# Patient Record
Sex: FEMALE | Race: WHITE | NOT HISPANIC OR LATINO | Employment: FULL TIME | URBAN - METROPOLITAN AREA
[De-identification: names, ages, dates, MRNs, and addresses within clinical notes are randomized per-mention and may not be internally consistent; named-entity substitution may affect disease eponyms.]

---

## 2022-10-26 ENCOUNTER — LAB (OUTPATIENT)
Dept: LAB | Facility: HOSPITAL | Age: 22
End: 2022-10-26

## 2022-10-26 ENCOUNTER — OFFICE VISIT (OUTPATIENT)
Dept: INTERNAL MEDICINE | Facility: CLINIC | Age: 22
End: 2022-10-26

## 2022-10-26 VITALS
HEART RATE: 76 BPM | OXYGEN SATURATION: 98 % | SYSTOLIC BLOOD PRESSURE: 98 MMHG | TEMPERATURE: 97 F | BODY MASS INDEX: 21.33 KG/M2 | WEIGHT: 144 LBS | DIASTOLIC BLOOD PRESSURE: 70 MMHG | HEIGHT: 69 IN

## 2022-10-26 DIAGNOSIS — R55 SYNCOPE, UNSPECIFIED SYNCOPE TYPE: ICD-10-CM

## 2022-10-26 DIAGNOSIS — Z13.1 SCREENING FOR DIABETES MELLITUS (DM): ICD-10-CM

## 2022-10-26 DIAGNOSIS — Z13.21 ENCOUNTER FOR VITAMIN DEFICIENCY SCREENING: ICD-10-CM

## 2022-10-26 DIAGNOSIS — R42 DIZZINESS: ICD-10-CM

## 2022-10-26 DIAGNOSIS — Z76.89 ENCOUNTER TO ESTABLISH CARE: Primary | ICD-10-CM

## 2022-10-26 DIAGNOSIS — R23.1 PALLOR: ICD-10-CM

## 2022-10-26 DIAGNOSIS — R09.82 PND (POST-NASAL DRIP): ICD-10-CM

## 2022-10-26 DIAGNOSIS — Z13.220 SCREENING FOR HYPERLIPIDEMIA: ICD-10-CM

## 2022-10-26 LAB
25(OH)D3 SERPL-MCNC: 33 NG/ML (ref 30–100)
ALBUMIN SERPL-MCNC: 4.8 G/DL (ref 3.5–5.2)
ALBUMIN/GLOB SERPL: 1.5 G/DL
ALP SERPL-CCNC: 70 U/L (ref 39–117)
ALT SERPL W P-5'-P-CCNC: 27 U/L (ref 1–33)
ANION GAP SERPL CALCULATED.3IONS-SCNC: 10.3 MMOL/L (ref 5–15)
AST SERPL-CCNC: 25 U/L (ref 1–32)
BACTERIA UR QL AUTO: NORMAL /HPF
BILIRUB SERPL-MCNC: 0.2 MG/DL (ref 0–1.2)
BILIRUB UR QL STRIP: NEGATIVE
BUN SERPL-MCNC: 13 MG/DL (ref 6–20)
BUN/CREAT SERPL: 18.3 (ref 7–25)
CALCIUM SPEC-SCNC: 9.4 MG/DL (ref 8.6–10.5)
CHLORIDE SERPL-SCNC: 102 MMOL/L (ref 98–107)
CHOLEST SERPL-MCNC: 190 MG/DL (ref 0–200)
CLARITY UR: CLEAR
CO2 SERPL-SCNC: 25.7 MMOL/L (ref 22–29)
COLOR UR: YELLOW
CREAT SERPL-MCNC: 0.71 MG/DL (ref 0.57–1)
DEPRECATED RDW RBC AUTO: 43.6 FL (ref 37–54)
EGFRCR SERPLBLD CKD-EPI 2021: 122.7 ML/MIN/1.73
ERYTHROCYTE [DISTWIDTH] IN BLOOD BY AUTOMATED COUNT: 13.3 % (ref 12.3–15.4)
GLOBULIN UR ELPH-MCNC: 3.1 GM/DL
GLUCOSE SERPL-MCNC: 93 MG/DL (ref 65–99)
GLUCOSE UR STRIP-MCNC: NEGATIVE MG/DL
HBA1C MFR BLD: 5.1 % (ref 4.8–5.6)
HCT VFR BLD AUTO: 40.3 % (ref 34–46.6)
HDLC SERPL-MCNC: 70 MG/DL (ref 40–60)
HGB BLD-MCNC: 13.1 G/DL (ref 12–15.9)
HGB UR QL STRIP.AUTO: NEGATIVE
HYALINE CASTS UR QL AUTO: NORMAL /LPF
IRON 24H UR-MRATE: 27 MCG/DL (ref 37–145)
IRON SATN MFR SERPL: 5 % (ref 20–50)
KETONES UR QL STRIP: NEGATIVE
LDLC SERPL CALC-MCNC: 113 MG/DL (ref 0–100)
LDLC/HDLC SERPL: 1.61 {RATIO}
LEUKOCYTE ESTERASE UR QL STRIP.AUTO: ABNORMAL
MCH RBC QN AUTO: 29 PG (ref 26.6–33)
MCHC RBC AUTO-ENTMCNC: 32.5 G/DL (ref 31.5–35.7)
MCV RBC AUTO: 89.2 FL (ref 79–97)
NITRITE UR QL STRIP: NEGATIVE
PH UR STRIP.AUTO: 6 [PH] (ref 5–8)
PLATELET # BLD AUTO: 234 10*3/MM3 (ref 140–450)
PMV BLD AUTO: 9.9 FL (ref 6–12)
POTASSIUM SERPL-SCNC: 4.2 MMOL/L (ref 3.5–5.2)
PROT SERPL-MCNC: 7.9 G/DL (ref 6–8.5)
PROT UR QL STRIP: NEGATIVE
RBC # BLD AUTO: 4.52 10*6/MM3 (ref 3.77–5.28)
RBC # UR STRIP: NORMAL /HPF
REF LAB TEST METHOD: NORMAL
SODIUM SERPL-SCNC: 138 MMOL/L (ref 136–145)
SP GR UR STRIP: 1.01 (ref 1–1.03)
SQUAMOUS #/AREA URNS HPF: NORMAL /HPF
TIBC SERPL-MCNC: 575 MCG/DL (ref 298–536)
TRANSFERRIN SERPL-MCNC: 386 MG/DL (ref 200–360)
TRIGL SERPL-MCNC: 37 MG/DL (ref 0–150)
TSH SERPL DL<=0.05 MIU/L-ACNC: 1.57 UIU/ML (ref 0.27–4.2)
UROBILINOGEN UR QL STRIP: ABNORMAL
VIT B12 BLD-MCNC: 526 PG/ML (ref 211–946)
VLDLC SERPL-MCNC: 7 MG/DL (ref 5–40)
WBC # UR STRIP: NORMAL /HPF
WBC NRBC COR # BLD: 8.05 10*3/MM3 (ref 3.4–10.8)

## 2022-10-26 PROCEDURE — 99204 OFFICE O/P NEW MOD 45 MIN: CPT | Performed by: NURSE PRACTITIONER

## 2022-10-26 PROCEDURE — 82306 VITAMIN D 25 HYDROXY: CPT | Performed by: NURSE PRACTITIONER

## 2022-10-26 PROCEDURE — 80050 GENERAL HEALTH PANEL: CPT | Performed by: NURSE PRACTITIONER

## 2022-10-26 PROCEDURE — 80061 LIPID PANEL: CPT | Performed by: NURSE PRACTITIONER

## 2022-10-26 PROCEDURE — 83540 ASSAY OF IRON: CPT | Performed by: NURSE PRACTITIONER

## 2022-10-26 PROCEDURE — 82607 VITAMIN B-12: CPT | Performed by: NURSE PRACTITIONER

## 2022-10-26 PROCEDURE — 84466 ASSAY OF TRANSFERRIN: CPT | Performed by: NURSE PRACTITIONER

## 2022-10-26 PROCEDURE — 81001 URINALYSIS AUTO W/SCOPE: CPT | Performed by: NURSE PRACTITIONER

## 2022-10-26 PROCEDURE — 83036 HEMOGLOBIN GLYCOSYLATED A1C: CPT | Performed by: NURSE PRACTITIONER

## 2022-10-26 RX ORDER — FEXOFENADINE HCL 180 MG/1
180 TABLET ORAL DAILY
Qty: 30 TABLET | Refills: 3 | Status: SHIPPED | OUTPATIENT
Start: 2022-10-26 | End: 2023-01-09

## 2022-10-26 RX ORDER — FLUTICASONE PROPIONATE 50 MCG
2 SPRAY, SUSPENSION (ML) NASAL DAILY
Qty: 18.2 ML | Refills: 3 | Status: SHIPPED | OUTPATIENT
Start: 2022-10-26 | End: 2023-01-09

## 2022-10-26 NOTE — PROGRESS NOTES
"    Office Note     Name: Marcelina Verma    : 2000     MRN: [id number]     Chief Complaint  Establish Care, Nasal Congestion, and Dizziness (Pt has dizziness about two years ago.)    Subjective     History of Present Illness:  Marcelina Verma is a 22 y.o. female who presents today to establish care with a new provider and to address current complaints.  Patient has no significant past medical history or surgical history.  Patient does not currently take any home medications.  Patient denies tobacco use or vaping.  Patient denies recreational drug use.  Patient reports rare alcohol use.      She complains of postnasal drainage that is worse in the morning after awakening.  She does report she can feel the drainage down the back of her throat and it makes her sick to her stomach.  She has not tried any over-the-counter medications for the symptoms.    She also has complaints of dizziness and syncopal episodes that have been worsening.  She has been experiencing this for the past 2 years and it was previously happening about 4 times a year.  Recently the frequency has increased and she has been experiencing dizziness and syncopal episodes a few times monthly.  She did have 2 episodes in the past 3 weeks.  Friends have told her that she becomes very pale when the episodes happen and she feels the back of her neck get very hot and diaphoretic.  She has lost consciousness a few times and reports if she is not sitting she will \"pass out\".  She denies any associated symptoms such as frequent headaches, vision changes, chest pain, or shortness of breath.  She does not think is associated with low blood sugar as it is happened about 20 minutes after eating.  It has also happened while she has been at the gym.  She is concerned due to her half brother who is approximately 29 years old having a recent CVA due to a cardiac defect reported as a \"hole in his heart\".    Patient denies further complaints or concerns at this " "time.  Had a pleasant visit with the patient today.    Review of Systems    History reviewed. No pertinent past medical history.    History reviewed. No pertinent surgical history.    Social History     Socioeconomic History   • Marital status: Single   Tobacco Use   • Smoking status: Never   • Smokeless tobacco: Never   Substance and Sexual Activity   • Alcohol use: Defer   • Drug use: Never   • Sexual activity: Not Currently     Partners: Male         Current Outpatient Medications:   •  fexofenadine (Allegra Allergy) 180 MG tablet, Take 1 tablet by mouth Daily., Disp: 30 tablet, Rfl: 3  •  fluticasone (Flonase) 50 MCG/ACT nasal spray, 2 sprays into the nostril(s) as directed by provider Daily., Disp: 18.2 mL, Rfl: 3    Objective     Vital Signs  BP 98/70   Pulse 76   Temp 97 °F (36.1 °C)   Ht 175.3 cm (69\")   Wt 65.3 kg (144 lb)   SpO2 98%   BMI 21.27 kg/m²   Estimated body mass index is 21.27 kg/m² as calculated from the following:    Height as of this encounter: 175.3 cm (69\").    Weight as of this encounter: 65.3 kg (144 lb).    BMI is within normal parameters. No other follow-up for BMI required.      Physical Exam  Constitutional:       Appearance: Normal appearance. She is normal weight.   HENT:      Head: Normocephalic and atraumatic.      Right Ear: Tympanic membrane, ear canal and external ear normal.      Left Ear: Tympanic membrane, ear canal and external ear normal.      Ears:      Comments: Clear effusion bilaterally     Nose: Nose normal.   Eyes:      Extraocular Movements: Extraocular movements intact.      Conjunctiva/sclera: Conjunctivae normal.      Pupils: Pupils are equal, round, and reactive to light.   Cardiovascular:      Rate and Rhythm: Normal rate and regular rhythm.   Pulmonary:      Effort: Pulmonary effort is normal.      Breath sounds: Normal breath sounds.   Abdominal:      General: Abdomen is flat.   Musculoskeletal:         General: Normal range of motion.      Cervical " back: Neck supple.   Skin:     General: Skin is warm and dry.   Neurological:      General: No focal deficit present.      Mental Status: She is alert and oriented to person, place, and time. Mental status is at baseline.   Psychiatric:         Mood and Affect: Mood normal.         Behavior: Behavior normal.         Thought Content: Thought content normal.         Judgment: Judgment normal.          Assessment and Plan     Diagnoses and all orders for this visit:    1. Encounter to establish care (Primary)  Patient presents to establish care with a new provider.  Will address current complaints at today's visit.  Will follow-up in 2 weeks to readdress symptoms and review labs.  Patient will need to make appointment for annual physical exam, which can be done at a later date.    -     TSH    2. Syncope, unspecified syncope type  Worsening.  Has been ongoing for 2 years.  Will do fasting labs today.  Given family history, will refer to cardiology for further work-up and evaluation.    -     MRI Brain Without Contrast; Future  -     Comprehensive Metabolic Panel  -     Ambulatory Referral to Cardiology    3. Dizziness  Worsening.  Has been ongoing for 2 years.  Will do fasting labs today.  Will do MRI of the brain without to rule out neurological etiology.    -     MRI Brain Without Contrast; Future  -     Comprehensive Metabolic Panel  -     TSH  -     Ambulatory Referral to Cardiology    4. PND (post-nasal drip)  Will start Flonase daily and Allegra daily.  Reevaluate symptoms in 2 weeks.    -     fluticasone (Flonase) 50 MCG/ACT nasal spray; 2 sprays into the nostril(s) as directed by provider Daily.  Dispense: 18.2 mL; Refill: 3  -     fexofenadine (Allegra Allergy) 180 MG tablet; Take 1 tablet by mouth Daily.  Dispense: 30 tablet; Refill: 3    5. Pallor  Will check CBC and iron panel to rule out anemia or iron deficiency.    -     CBC (No Diff)  -     Iron Profile    6. Encounter for vitamin deficiency  screening  -     Vitamin D,25-Hydroxy  -     Vitamin B12    7. Screening for diabetes mellitus (DM)  -     Urinalysis With Microscopic If Indicated (No Culture) - Urine, Clean Catch  -     Hemoglobin A1c    8. Screening for hyperlipidemia  -     Lipid Panel        Follow Up  Return in about 2 weeks (around 11/9/2022).    RAQUEL White    Part of this note may be an electronic transcription/translation of spoken language to printed text using the Dragon Dictation System.

## 2022-10-31 ENCOUNTER — OFFICE VISIT (OUTPATIENT)
Dept: CARDIOLOGY | Facility: CLINIC | Age: 22
End: 2022-10-31

## 2022-10-31 VITALS
HEART RATE: 72 BPM | SYSTOLIC BLOOD PRESSURE: 120 MMHG | DIASTOLIC BLOOD PRESSURE: 79 MMHG | WEIGHT: 145 LBS | BODY MASS INDEX: 21.48 KG/M2 | HEIGHT: 69 IN

## 2022-10-31 DIAGNOSIS — R55 NEAR SYNCOPE: ICD-10-CM

## 2022-10-31 DIAGNOSIS — R00.2 PALPITATIONS: Primary | ICD-10-CM

## 2022-10-31 PROCEDURE — 99203 OFFICE O/P NEW LOW 30 MIN: CPT | Performed by: PHYSICIAN ASSISTANT

## 2022-10-31 PROCEDURE — 93000 ELECTROCARDIOGRAM COMPLETE: CPT | Performed by: PHYSICIAN ASSISTANT

## 2022-10-31 NOTE — PROGRESS NOTES
[facility]  INITIAL OFFICE CONSULT      Marcelina Verma  2000  PCP: Belkis Navarro APRN    SUBJECTIVE:   Marcelina Verma is a 22 y.o. 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  She states she had episodes of dizziness near syncope as an adolescent.  She states they went away for a while but recently returned.  She has had a couple episodes in the past month.  She states a lot of these episodes do seem to occur when going from a sitting to standing position.  She also feels that sometimes when these episodes occur they are associate with a hot flushed feeling usually in the back of her neck some diaphoresis cool clammy feeling.  She does [employer] get nauseous.  A lot of times she will get nearly to the point where she felt like she is going to pass out she has to sit down and rest for few moments until this passes.  She states she exercises finding 3 to 4 days a week she lifts weights.  She also tries to drink plenty water.  She denies that she is having any chest pain or chest tightness.  She denies any heart failure type symptoms.          Cardiac PMH: (Old records have been reviewed and summarized below)  1. Syncope  2. Seasonal allergies  3. Iron deficiency- Iron 27 (11/9/2022)  4. Mild HLD,       Past Medical History, Past Surgical History, Family history, Social History, and Medications were all reviewed with the patient today and updated as necessary.     Current Outpatient Medications   Medication Sig Dispense Refill   • fexofenadine (Allegra Allergy) 180 MG tablet Take 1 tablet by mouth  "Daily. 30 tablet 3   • fluticasone (Flonase) 50 MCG/ACT nasal spray 2 sprays into the nostril(s) as directed by provider Daily. 18.2 mL 3     No current facility-administered medications for this visit.     No Known Allergies      Past Medical History:   Diagnosis Date   • Dizziness      No past surgical history on file.  Family History   Problem Relation Age of Onset   • Thyroid disease Mother    • Alcohol abuse Father    • Stroke Brother    • Stroke Maternal Grandmother    • Heart disease Maternal Grandmother    • Diabetes Maternal Grandmother    • Cancer Maternal Grandfather      Social History     Tobacco Use   • Smoking status: Never   • Smokeless tobacco: Never   Substance Use Topics   • Alcohol use: Defer       ROS:  Review of Symptoms:  General: no recent weight loss/gain, weakness or fatigue  Skin: no rashes, lumps, or other skin changes  HEENT: + dizziness, lightheadedness, or vision changes  Respiratory: no cough or hemoptysis  Cardiovascular: + palpitations, and tachycardia  Gastrointestinal: no black/tarry stools or diarrhea  Urinary: no change in frequency or urgency  Peripheral Vascular: no claudication or leg cramps  Musculoskeletal: no muscle or joint pain/stiffness  Psychiatric: no depression or excessive stress  Neurological: no sensory or motor loss, no syncope  Hematologic: no anemia, easy bruising or bleeding  Endocrine: no thyroid problems, nor heat or cold intolerance         PHYSICAL EXAM:   /79 (BP Location: Right arm, Patient Position: Sitting)   Pulse 72   Ht 175.3 cm (69.02\")   Wt 65.8 kg (145 lb)   LMP 10/10/2022 (Approximate)   BMI 21.40 kg/m²      Wt Readings from Last 5 Encounters:   10/31/22 65.8 kg (145 lb)   10/26/22 65.3 kg (144 lb)     BP Readings from Last 5 Encounters:   10/31/22 120/79   10/26/22 98/70       General-Well Nourished, Well developed  Eyes - PERRLA  Neck- supple, No mass  CV- regular rate and rhythm, no MRG  Lung- clear bilaterally  Abd- soft, " +BS  Musc/skel - Norm strength and range of motion  Skin- warm and dry  Neuro - Alert & Oriented x 3, appropriate mood.    Patient's external notes were reviewed.  Independent interpretation of test performed by another physician in facility were reviewed.  Outside laboratory data was also reviewed.    Medical problems and test results were reviewed with the patient today.     Results for orders placed or performed in visit on 10/26/22   Comprehensive Metabolic Panel    Specimen: Blood   Result Value Ref Range    Glucose 93 65 - 99 mg/dL    BUN 13 6 - 20 mg/dL    Creatinine 0.71 0.57 - 1.00 mg/dL    Sodium 138 136 - 145 mmol/L    Potassium 4.2 3.5 - 5.2 mmol/L    Chloride 102 98 - 107 mmol/L    CO2 25.7 22.0 - 29.0 mmol/L    Calcium 9.4 8.6 - 10.5 mg/dL    Total Protein 7.9 6.0 - 8.5 g/dL    Albumin 4.80 3.50 - 5.20 g/dL    ALT (SGPT) 27 1 - 33 U/L    AST (SGOT) 25 1 - 32 U/L    Alkaline Phosphatase 70 39 - 117 U/L    Total Bilirubin 0.2 0.0 - 1.2 mg/dL    Globulin 3.1 gm/dL    A/G Ratio 1.5 g/dL    BUN/Creatinine Ratio 18.3 7.0 - 25.0    Anion Gap 10.3 5.0 - 15.0 mmol/L    eGFR 122.7 >60.0 mL/min/1.73   CBC (No Diff)    Specimen: Blood   Result Value Ref Range    WBC 8.05 3.40 - 10.80 10*3/mm3    RBC 4.52 3.77 - 5.28 10*6/mm3    Hemoglobin 13.1 12.0 - 15.9 g/dL    Hematocrit 40.3 34.0 - 46.6 %    MCV 89.2 79.0 - 97.0 fL    MCH 29.0 26.6 - 33.0 pg    MCHC 32.5 31.5 - 35.7 g/dL    RDW 13.3 12.3 - 15.4 %    RDW-SD 43.6 37.0 - 54.0 fl    MPV 9.9 6.0 - 12.0 fL    Platelets 234 140 - 450 10*3/mm3   TSH    Specimen: Blood   Result Value Ref Range    TSH 1.570 0.270 - 4.200 uIU/mL   Urinalysis With Microscopic If Indicated (No Culture) - Urine, Clean Catch    Specimen: Urine, Clean Catch   Result Value Ref Range    Color, UA Yellow Yellow, Straw    Appearance, UA Clear Clear    pH, UA 6.0 5.0 - 8.0    Specific Gravity, UA 1.009 1.005 - 1.030    Glucose, UA Negative Negative    Ketones, UA Negative Negative    Bilirubin, UA  Negative Negative    Blood, UA Negative Negative    Protein, UA Negative Negative    Leuk Esterase, UA Trace (A) Negative    Nitrite, UA Negative Negative    Urobilinogen, UA 0.2 E.U./dL 0.2 - 1.0 E.U./dL   Iron Profile    Specimen: Blood   Result Value Ref Range    Iron 27 (L) 37 - 145 mcg/dL    Iron Saturation 5 (L) 20 - 50 %    Transferrin 386 (H) 200 - 360 mg/dL    TIBC 575 (H) 298 - 536 mcg/dL   Lipid Panel    Specimen: Blood   Result Value Ref Range    Total Cholesterol 190 0 - 200 mg/dL    Triglycerides 37 0 - 150 mg/dL    HDL Cholesterol 70 (H) 40 - 60 mg/dL    LDL Cholesterol  113 (H) 0 - 100 mg/dL    VLDL Cholesterol 7 5 - 40 mg/dL    LDL/HDL Ratio 1.61    Hemoglobin A1c    Specimen: Blood   Result Value Ref Range    Hemoglobin A1C 5.10 4.80 - 5.60 %   Vitamin D,25-Hydroxy    Specimen: Blood   Result Value Ref Range    25 Hydroxy, Vitamin D 33.0 30.0 - 100.0 ng/ml   Vitamin B12    Specimen: Blood   Result Value Ref Range    Vitamin B-12 526 211 - 946 pg/mL   Urinalysis, Microscopic Only - Urine, Clean Catch    Specimen: Urine, Clean Catch   Result Value Ref Range    RBC, UA 0-2 None Seen, 0-2 /HPF    WBC, UA 0-2 None Seen, 0-2 /HPF    Bacteria, UA None Seen None Seen /HPF    Squamous Epithelial Cells, UA 0-2 None Seen, 0-2 /HPF    Hyaline Casts, UA None Seen None Seen /LPF    Methodology Automated Microscopy          Lab Results   Component Value Date    CHOL 190 10/26/2022    HDL 70 (H) 10/26/2022     (H) 10/26/2022    VLDL 7 10/26/2022       EKG:  (EKG/Tracing has been independently visualized by me and summarized below)      ECG 12 Lead    Date/Time: 10/31/2022 3:07 PM  Performed by: Benedict Treviño PA  Authorized by: Benedict Treviño PA   Rhythm: sinus rhythm  Rate: normal  Conduction: conduction normal  ST Segments: ST segments normal  T Waves: T waves normal  QRS axis: normal  Other: no other findings    Clinical impression: normal ECG            ASSESSMENT   1. Syncope  2. Seasonal  Allergies  3. Iron deficiency       PLAN  · Echocardiogram  · Holter monitor  · Increase hydration, sodium. Alberto hose stockings. Correct anemia.   Electronically signed by JASON Landin, 10/31/22, 3:08 PM EDT.          Cardiology/Electrophysiology  10/31/22  15:01 EDT  Will Hudson RODRIGUEZ

## 2022-11-09 ENCOUNTER — OFFICE VISIT (OUTPATIENT)
Dept: INTERNAL MEDICINE | Facility: CLINIC | Age: 22
End: 2022-11-09

## 2022-11-09 VITALS
DIASTOLIC BLOOD PRESSURE: 68 MMHG | OXYGEN SATURATION: 99 % | HEIGHT: 69 IN | BODY MASS INDEX: 21.77 KG/M2 | TEMPERATURE: 97 F | HEART RATE: 61 BPM | WEIGHT: 147 LBS | SYSTOLIC BLOOD PRESSURE: 98 MMHG

## 2022-11-09 DIAGNOSIS — R42 DIZZINESS: ICD-10-CM

## 2022-11-09 DIAGNOSIS — E78.00 ELEVATED LDL CHOLESTEROL LEVEL: ICD-10-CM

## 2022-11-09 DIAGNOSIS — Z09 FOLLOW-UP EXAM: Primary | ICD-10-CM

## 2022-11-09 DIAGNOSIS — E61.1 IRON DEFICIENCY: ICD-10-CM

## 2022-11-09 PROCEDURE — 99213 OFFICE O/P EST LOW 20 MIN: CPT | Performed by: NURSE PRACTITIONER

## 2022-11-09 RX ORDER — FERROUS SULFATE TAB EC 324 MG (65 MG FE EQUIVALENT) 324 (65 FE) MG
324 TABLET DELAYED RESPONSE ORAL
Qty: 30 TABLET | Refills: 6 | Status: SHIPPED | OUTPATIENT
Start: 2022-11-09

## 2022-11-09 NOTE — PROGRESS NOTES
"    Office Note     Name: Marcelina Verma    : 2000     MRN: [id number]     Chief Complaint  Dizziness and Follow-up    Subjective     History of Present Illness:  Marcelina Verma is a 22 y.o. female who presents today for a follow-up visit and lab review.  Since last visit patient has been seen by Dr. Treviño with cardiology on 10/31.  He did an EKG which showed normal sinus rhythm.  She is currently wearing a Holter monitor for 2 weeks.  She does have an upcoming echo scheduled for .  She also has an MRI of her brain scheduled for .  Patient denies any changes to her symptoms.  Patient is feeling well in the office today.  Patient denies further complaints or concerns at this time.    Review of Systems   Neurological: Positive for dizziness.       Past Medical History:   Diagnosis Date   • Dizziness        No past surgical history on file.    Social History     Socioeconomic History   • Marital status: Single   Tobacco Use   • Smoking status: Never   • Smokeless tobacco: Never   Vaping Use   • Vaping Use: Never used   Substance and Sexual Activity   • Alcohol use: Defer   • Drug use: Never   • Sexual activity: Not Currently     Partners: Male         Current Outpatient Medications:   •  fexofenadine (Allegra Allergy) 180 MG tablet, Take 1 tablet by mouth Daily., Disp: 30 tablet, Rfl: 3  •  fluticasone (Flonase) 50 MCG/ACT nasal spray, 2 sprays into the nostril(s) as directed by provider Daily., Disp: 18.2 mL, Rfl: 3    Objective     Vital Signs  BP 98/68   Pulse 61   Temp 97 °F (36.1 °C)   Ht 175.3 cm (69.02\")   Wt 66.7 kg (147 lb)   SpO2 99%   BMI 21.70 kg/m²   Estimated body mass index is 21.7 kg/m² as calculated from the following:    Height as of this encounter: 175.3 cm (69.02\").    Weight as of this encounter: 66.7 kg (147 lb).    BMI is within normal parameters. No other follow-up for BMI required.      Physical Exam  Constitutional:       Appearance: Normal appearance. She is " normal weight.   HENT:      Head: Normocephalic and atraumatic.      Nose: Nose normal.   Eyes:      Extraocular Movements: Extraocular movements intact.      Conjunctiva/sclera: Conjunctivae normal.      Pupils: Pupils are equal, round, and reactive to light.   Cardiovascular:      Rate and Rhythm: Normal rate.   Pulmonary:      Effort: Pulmonary effort is normal.   Musculoskeletal:         General: Normal range of motion.      Cervical back: Neck supple.   Skin:     General: Skin is warm and dry.   Neurological:      General: No focal deficit present.      Mental Status: She is alert and oriented to person, place, and time. Mental status is at baseline.   Psychiatric:         Mood and Affect: Mood normal.         Behavior: Behavior normal.         Thought Content: Thought content normal.         Judgment: Judgment normal.         Latest Reference Range & Units 10/26/22 09:23   Glucose 65 - 99 mg/dL 93   Sodium 136 - 145 mmol/L 138   Potassium 3.5 - 5.2 mmol/L 4.2   CO2 22.0 - 29.0 mmol/L 25.7   Chloride 98 - 107 mmol/L 102   Anion Gap 5.0 - 15.0 mmol/L 10.3   Creatinine 0.57 - 1.00 mg/dL 0.71   BUN 6 - 20 mg/dL 13   BUN/Creatinine Ratio 7.0 - 25.0  18.3   Calcium 8.6 - 10.5 mg/dL 9.4   eGFR >60.0 mL/min/1.73 122.7   Alkaline Phosphatase 39 - 117 U/L 70   Total Protein 6.0 - 8.5 g/dL 7.9   ALT (SGPT) 1 - 33 U/L 27   AST (SGOT) 1 - 32 U/L 25   Total Bilirubin 0.0 - 1.2 mg/dL 0.2   Albumin 3.50 - 5.20 g/dL 4.80   Globulin gm/dL 3.1   A/G Ratio g/dL 1.5   Hemoglobin A1C 4.80 - 5.60 % 5.10   TSH Baseline 0.270 - 4.200 uIU/mL 1.570   Iron 37 - 145 mcg/dL 27 (L)   Iron Saturation 20 - 50 % 5 (L)   Transferrin 200 - 360 mg/dL 386 (H)   TIBC 298 - 536 mcg/dL 575 (H)   Total Cholesterol 0 - 200 mg/dL 190   HDL Cholesterol 40 - 60 mg/dL 70 (H)   LDL Cholesterol  0 - 100 mg/dL 113 (H)   VLDL Cholesterol 5 - 40 mg/dL 7   Triglycerides 0 - 150 mg/dL 37   LDL/HDL Ratio  1.61   Vitamin B-12 211 - 946 pg/mL 526   25 Hydroxy,  Vitamin D 30.0 - 100.0 ng/ml 33.0   WBC 3.40 - 10.80 10*3/mm3 8.05   RBC 3.77 - 5.28 10*6/mm3 4.52   Hemoglobin 12.0 - 15.9 g/dL 13.1   Hematocrit 34.0 - 46.6 % 40.3   RDW 12.3 - 15.4 % 13.3   MCV 79.0 - 97.0 fL 89.2   MCH 26.6 - 33.0 pg 29.0   MCHC 31.5 - 35.7 g/dL 32.5   MPV 6.0 - 12.0 fL 9.9   Platelets 140 - 450 10*3/mm3 234   RDW-SD 37.0 - 54.0 fl 43.6   Color, UA Yellow, Straw  Yellow   Appearance, UA Clear  Clear   Specific Gravity, UA 1.005 - 1.030  1.009   pH, UA 5.0 - 8.0  6.0   Glucose Negative  Negative   Ketones, UA Negative  Negative   Blood, UA Negative  Negative   Nitrite, UA Negative  Negative   Leukocytes, UA Negative  Trace !   Protein, UA Negative  Negative   Bilirubin, UA Negative  Negative   Urobilinogen, UA 0.2 - 1.0 E.U./dL  0.2 E.U./dL   RBC, UA None Seen, 0-2 /HPF 0-2   WBC, UA None Seen, 0-2 /HPF 0-2   Bacteria, UA None Seen /HPF None Seen   Squamous Epithelial Cells, UA None Seen, 0-2 /HPF 0-2   Hyaline Casts, UA None Seen /LPF None Seen   Methodology:  Automated Microscopy   (L): Data is abnormally low  (H): Data is abnormally high  !: Data is abnormal    Assessment and Plan     Diagnoses and all orders for this visit:    1. Follow-up exam (Primary)    2. Elevated LDL cholesterol level    3. Iron deficiency    4. Dizziness    Plan:  Lab results reviewed with patient.  Patient is currently wearing a Holter monitor for 2 weeks per Dr. Treviño with cardiology.  Patient is scheduled for an echo on 11/20.  Patient is scheduled for an MRI of the brain on 11/20.  Labs showed iron deficiency.  Will send in an oral iron supplement. Advised patient to start taking 3 times a week.  If able to tolerate without constipation, increase to daily.  If constipation does occur, patient may back the iron back down to 3 times weekly.  Increase iron rich foods in her diet.  Total cholesterol is good.  LDL slightly elevated at 113, HDL looks great at 70.  Discussed with patient dietary modifications to lower  LDL.  Continue with healthy lifestyle choices including healthy diet and regular exercise routine.  We will follow-up with patient in 2 months to reevaluate symptoms and further work-up.    Follow Up  Return in about 2 months (around 1/9/2023).    RAQUEL White    Part of this note may be an electronic transcription/translation of spoken language to printed text using the Dragon Dictation System.

## 2022-11-20 ENCOUNTER — HOSPITAL ENCOUNTER (OUTPATIENT)
Dept: MRI IMAGING | Facility: HOSPITAL | Age: 22
Discharge: HOME | End: 2022-11-20

## 2022-11-20 ENCOUNTER — HOSPITAL ENCOUNTER (OUTPATIENT)
Dept: CARDIOLOGY | Facility: HOSPITAL | Age: 22
Discharge: HOME | End: 2022-11-20

## 2022-11-20 VITALS — WEIGHT: 145.06 LBS | HEIGHT: 69 IN | BODY MASS INDEX: 21.49 KG/M2

## 2022-11-20 DIAGNOSIS — R42 DIZZINESS: ICD-10-CM

## 2022-11-20 DIAGNOSIS — R55 SYNCOPE, UNSPECIFIED SYNCOPE TYPE: ICD-10-CM

## 2022-11-20 DIAGNOSIS — R00.2 PALPITATIONS: ICD-10-CM

## 2022-11-20 LAB
ASCENDING AORTA: 2.6 CM
BH CV ECHO MEAS - AO MAX PG: 5.4 MMHG
BH CV ECHO MEAS - AO MEAN PG: 3 MMHG
BH CV ECHO MEAS - AO ROOT DIAM: 2.8 CM
BH CV ECHO MEAS - AO V2 MAX: 116 CM/SEC
BH CV ECHO MEAS - AO V2 VTI: 24.4 CM
BH CV ECHO MEAS - AVA(I,D): 2.6 CM2
BH CV ECHO MEAS - EDV(CUBED): 19.7 ML
BH CV ECHO MEAS - EDV(MOD-SP2): 64.6 ML
BH CV ECHO MEAS - EDV(MOD-SP4): 72.3 ML
BH CV ECHO MEAS - EF(MOD-BP): 68.5 %
BH CV ECHO MEAS - EF(MOD-SP2): 73.2 %
BH CV ECHO MEAS - EF(MOD-SP4): 62 %
BH CV ECHO MEAS - ESV(CUBED): 22 ML
BH CV ECHO MEAS - ESV(MOD-SP2): 17.3 ML
BH CV ECHO MEAS - ESV(MOD-SP4): 27.5 ML
BH CV ECHO MEAS - IVS/LVPW: 0.9 CM
BH CV ECHO MEAS - IVSD: 0.8 CM
BH CV ECHO MEAS - LA DIMENSION: 3.1 CM
BH CV ECHO MEAS - LAT PEAK E' VEL: 23.6 CM/SEC
BH CV ECHO MEAS - LV DIASTOLIC VOL/BSA (35-75): 40.2 CM2
BH CV ECHO MEAS - LV MASS(C)D: 187.3 GRAMS
BH CV ECHO MEAS - LV MAX PG: 2.9 MMHG
BH CV ECHO MEAS - LV MEAN PG: 2 MMHG
BH CV ECHO MEAS - LV SYSTOLIC VOL/BSA (12-30): 15.3 CM2
BH CV ECHO MEAS - LV V1 MAX: 85 CM/SEC
BH CV ECHO MEAS - LV V1 VTI: 18.3 CM
BH CV ECHO MEAS - LVIDD: 4.6 CM
BH CV ECHO MEAS - LVIDS: 2.8 CM
BH CV ECHO MEAS - LVOT AREA: 3.5 CM2
BH CV ECHO MEAS - LVOT DIAM: 2.1 CM
BH CV ECHO MEAS - LVPWD: 0.9 CM
BH CV ECHO MEAS - MED PEAK E' VEL: 14.3 CM/SEC
BH CV ECHO MEAS - MV A MAX VEL: 44.9 CM/SEC
BH CV ECHO MEAS - MV DEC SLOPE: 386 CM/SEC2
BH CV ECHO MEAS - MV DEC TIME: 0.2 MSEC
BH CV ECHO MEAS - MV E MAX VEL: 75.9 CM/SEC
BH CV ECHO MEAS - MV E/A: 1.69
BH CV ECHO MEAS - MV MAX PG: 2.9 MMHG
BH CV ECHO MEAS - MV MEAN PG: 1 MMHG
BH CV ECHO MEAS - MV V2 VTI: 21.5 CM
BH CV ECHO MEAS - MVA(VTI): 2.9 CM2
BH CV ECHO MEAS - PA ACC TIME: 0.17 SEC
BH CV ECHO MEAS - PA PR(ACCEL): 4.5 MMHG
BH CV ECHO MEAS - PA V2 MAX: 89.2 CM/SEC
BH CV ECHO MEAS - RAP SYSTOLE: 3 MMHG
BH CV ECHO MEAS - RVSP: 17 MMHG
BH CV ECHO MEAS - SI(MOD-SP2): 26.3 ML/M2
BH CV ECHO MEAS - SI(MOD-SP4): 24.9 ML/M2
BH CV ECHO MEAS - SV(LVOT): 63.4 ML
BH CV ECHO MEAS - SV(MOD-SP2): 47.3 ML
BH CV ECHO MEAS - SV(MOD-SP4): 44.8 ML
BH CV ECHO MEAS - TAPSE (>1.6): 1.88 CM
BH CV ECHO MEAS - TR MAX PG: 14.4 MMHG
BH CV ECHO MEAS - TR MAX VEL: 190 CM/SEC
BH CV ECHO MEASUREMENTS AVERAGE E/E' RATIO: 4.01
BH CV XLRA - RV BASE: 3.2 CM
BH CV XLRA - RV LENGTH: 7.4 CM
BH CV XLRA - RV MID: 2.3 CM
BH CV XLRA - TDI S': 15 CM/SEC
IVRT: 88 MSEC
LEFT ATRIUM VOLUME INDEX: 18.2 ML/M2
MAXIMAL PREDICTED HEART RATE: 197 BPM
STRESS TARGET HR: 167 BPM

## 2022-11-20 PROCEDURE — 70551 MRI BRAIN STEM W/O DYE: CPT

## 2022-11-20 PROCEDURE — 93306 TTE W/DOPPLER COMPLETE: CPT | Performed by: INTERNAL MEDICINE

## 2022-11-20 PROCEDURE — 93306 TTE W/DOPPLER COMPLETE: CPT

## 2022-11-29 DIAGNOSIS — R42 DIZZINESS: Primary | ICD-10-CM

## 2022-11-29 DIAGNOSIS — R55 SYNCOPE, UNSPECIFIED SYNCOPE TYPE: ICD-10-CM

## 2022-12-27 ENCOUNTER — APPOINTMENT (OUTPATIENT)
Dept: MRI IMAGING | Facility: HOSPITAL | Age: 22
End: 2022-12-27

## 2023-01-04 ENCOUNTER — HOSPITAL ENCOUNTER (OUTPATIENT)
Dept: MRI IMAGING | Facility: HOSPITAL | Age: 23
Discharge: HOME | End: 2023-01-04
Admitting: NURSE PRACTITIONER
Payer: COMMERCIAL

## 2023-01-04 ENCOUNTER — HOSPITAL ENCOUNTER (OUTPATIENT)
Dept: MRI IMAGING | Facility: HOSPITAL | Age: 23
End: 2023-01-04
Payer: COMMERCIAL

## 2023-01-04 DIAGNOSIS — R42 DIZZINESS: ICD-10-CM

## 2023-01-04 DIAGNOSIS — R55 SYNCOPE, UNSPECIFIED SYNCOPE TYPE: ICD-10-CM

## 2023-01-04 PROCEDURE — A9577 INJ MULTIHANCE: HCPCS | Performed by: NURSE PRACTITIONER

## 2023-01-04 PROCEDURE — 70552 MRI BRAIN STEM W/DYE: CPT

## 2023-01-04 PROCEDURE — 0 GADOBENATE DIMEGLUMINE 529 MG/ML SOLUTION: Performed by: NURSE PRACTITIONER

## 2023-01-04 RX ADMIN — GADOBENATE DIMEGLUMINE 13 ML: 529 INJECTION, SOLUTION INTRAVENOUS at 08:32

## 2023-01-09 ENCOUNTER — OFFICE VISIT (OUTPATIENT)
Dept: INTERNAL MEDICINE | Facility: CLINIC | Age: 23
End: 2023-01-09
Payer: COMMERCIAL

## 2023-01-09 VITALS
HEIGHT: 69 IN | HEART RATE: 61 BPM | TEMPERATURE: 97 F | WEIGHT: 151 LBS | OXYGEN SATURATION: 98 % | SYSTOLIC BLOOD PRESSURE: 90 MMHG | DIASTOLIC BLOOD PRESSURE: 68 MMHG | BODY MASS INDEX: 22.36 KG/M2

## 2023-01-09 DIAGNOSIS — G93.9 CEREBRAL LESION: Primary | ICD-10-CM

## 2023-01-09 DIAGNOSIS — G93.5 CHIARI I MALFORMATION: ICD-10-CM

## 2023-01-09 DIAGNOSIS — E61.1 IRON DEFICIENCY: ICD-10-CM

## 2023-01-09 DIAGNOSIS — E78.00 ELEVATED LDL CHOLESTEROL LEVEL: ICD-10-CM

## 2023-01-09 PROCEDURE — 99213 OFFICE O/P EST LOW 20 MIN: CPT | Performed by: NURSE PRACTITIONER

## 2023-01-17 NOTE — PROGRESS NOTES
"    Office Note     Name: Marcelina Verma    : 2000     MRN: [id number]     Chief Complaint  Hyperlipidemia, Anemia, and Dizziness    Subjective     History of Present Illness:  Marcelina Verma is a 22 y.o. female who presents today for a follow-up visit to review lab results and MRI findings.  Patient had an MRI of the brain that showed a 7 mm left posterior pontine/middle cerebral peduncle hyperintense lesion along with findings suggestive of a Chiari I malformation.  Patient has continued to have ongoing intermittent dizziness.  Lab results were reviewed with the patient.  Patient denies further complaints or concerns at this time.  Had a pleasant visit with the patient today.    Review of Systems   Constitutional: Negative.    HENT: Negative.    Respiratory: Negative.    Cardiovascular: Negative.    Gastrointestinal: Negative.    Genitourinary: Negative.    Neurological: Positive for dizziness.       Past Medical History:   Diagnosis Date   • Dizziness        No past surgical history on file.    Social History     Socioeconomic History   • Marital status: Single   Tobacco Use   • Smoking status: Never   • Smokeless tobacco: Never   Vaping Use   • Vaping Use: Never used   Substance and Sexual Activity   • Alcohol use: Defer   • Drug use: Never   • Sexual activity: Not Currently     Partners: Male         Current Outpatient Medications:   •  ferrous sulfate 324 (65 Fe) MG tablet delayed-release EC tablet, Take 1 tablet by mouth Daily With Breakfast., Disp: 30 tablet, Rfl: 6    Objective     Vital Signs  BP 90/68   Pulse 61   Temp 97 °F (36.1 °C)   Ht 175.3 cm (69.02\")   Wt 68.5 kg (151 lb)   SpO2 98%   BMI 22.29 kg/m²   Estimated body mass index is 22.29 kg/m² as calculated from the following:    Height as of this encounter: 175.3 cm (69.02\").    Weight as of this encounter: 68.5 kg (151 lb).    BMI is within normal parameters. No other follow-up for BMI required.      Physical " Exam  Constitutional:       Appearance: Normal appearance. She is normal weight.   HENT:      Head: Normocephalic and atraumatic.      Nose: Nose normal.   Eyes:      Extraocular Movements: Extraocular movements intact.      Conjunctiva/sclera: Conjunctivae normal.      Pupils: Pupils are equal, round, and reactive to light.   Cardiovascular:      Rate and Rhythm: Normal rate and regular rhythm.   Pulmonary:      Effort: Pulmonary effort is normal.      Breath sounds: Normal breath sounds.   Musculoskeletal:         General: Normal range of motion.      Cervical back: Neck supple.   Skin:     General: Skin is warm and dry.   Neurological:      General: No focal deficit present.      Mental Status: She is alert and oriented to person, place, and time. Mental status is at baseline.   Psychiatric:         Mood and Affect: Mood normal.         Behavior: Behavior normal.         Thought Content: Thought content normal.         Judgment: Judgment normal.          Assessment and Plan     Diagnoses and all orders for this visit:    1. Cerebral lesion (Primary)  -     Ambulatory Referral to Neurosurgery    2. Chiari I malformation (HCC)  -     Ambulatory Referral to Neurosurgery    3. Elevated LDL cholesterol level    4. Iron deficiency    Plan:  Based on MRI findings, will go ahead and refer her to neurosurgery for further evaluation and treatment.  Lab results reviewed with patient.  Patient does have elevated LDL at 113.  HDL is good at 70.  Discussed dietary modifications and exercise routine to reduce LDL.  Total cholesterol within normal range.  Patient previously encouraged to start an iron tablet daily.  She has not done this yet.  We informed she could take it 3 days weekly if she is concerned about constipation.  MRI recommendations to follow-up with a repeat image in 6 to 12 months.  We will plan on 6-month follow-up.  Return to clinic sooner if needed.      Follow Up  Return in about 6 months (around 7/9/2023)  for Recheck.    RAQUEL White    Part of this note may be an electronic transcription/translation of spoken language to printed text using the Dragon Dictation System.

## 2023-02-06 ENCOUNTER — OFFICE VISIT (OUTPATIENT)
Dept: NEUROSURGERY | Facility: CLINIC | Age: 23
End: 2023-02-06
Payer: COMMERCIAL

## 2023-02-06 VITALS
HEIGHT: 69 IN | TEMPERATURE: 97.8 F | WEIGHT: 152 LBS | BODY MASS INDEX: 22.51 KG/M2 | SYSTOLIC BLOOD PRESSURE: 110 MMHG | DIASTOLIC BLOOD PRESSURE: 62 MMHG

## 2023-02-06 DIAGNOSIS — G93.5 CHIARI MALFORMATION TYPE I: Primary | ICD-10-CM

## 2023-02-06 PROCEDURE — 99204 OFFICE O/P NEW MOD 45 MIN: CPT | Performed by: STUDENT IN AN ORGANIZED HEALTH CARE EDUCATION/TRAINING PROGRAM

## 2023-02-06 NOTE — PROGRESS NOTES
Patient: Marcelina Verma  : 2000    Primary Care Provider: Belkis Navarro APRN    Requesting Provider: As above      Chief Complaint: Dizziness (Lightheadedness ) and Headache      History of Present Illness: This is a 22 y.o. female who presents for evaluation of a Chiari malformation.  The patient underwent imaging of her head due to headaches and dizziness for over a year.  Patient has headaches in the posterior aspect of her head that seem to be exacerbated by leaning forward or bearing down.  She is also noted dizziness episodes especially when she is exercising or being active.  She underwent a full cardiology work-up which was negative.  She denies any significant changes in her vision or weakness in her arms or legs.  In addition to the Chiari malformation found on MRI, there was a nonspecific T2 lesion in the middle cerebral peduncle which was stable on repeat imaging.    PMHX  Allergies:  No Known Allergies  Medications    Current Outpatient Medications:   •  ferrous sulfate 324 (65 Fe) MG tablet delayed-release EC tablet, Take 1 tablet by mouth Daily With Breakfast., Disp: 30 tablet, Rfl: 6  Past Medical History:  Past Medical History:   Diagnosis Date   • Dizziness      Past Surgical History:  No past surgical history on file.  Social Hx:  Social History     Tobacco Use   • Smoking status: Never   • Smokeless tobacco: Never   Vaping Use   • Vaping Use: Never used   Substance Use Topics   • Alcohol use: Defer   • Drug use: Never     Family Hx:  Family History   Problem Relation Age of Onset   • Thyroid disease Mother    • Alcohol abuse Father    • Stroke Brother    • Stroke Maternal Grandmother    • Heart disease Maternal Grandmother    • Diabetes Maternal Grandmother    • Cancer Maternal Grandfather      Review of Systems:        Review of Systems   Constitutional: Negative for activity change, appetite change, chills, diaphoresis, fatigue, fever and unexpected weight change.   HENT: Negative for  "congestion, dental problem, drooling, ear discharge, ear pain, facial swelling, hearing loss, mouth sores, nosebleeds, postnasal drip, rhinorrhea, sinus pressure, sneezing, sore throat, tinnitus, trouble swallowing and voice change.    Eyes: Negative for photophobia, pain, discharge, redness, itching and visual disturbance.   Respiratory: Negative for apnea, cough, choking, chest tightness, shortness of breath, wheezing and stridor.    Cardiovascular: Negative for chest pain, palpitations and leg swelling.   Gastrointestinal: Negative for abdominal distention, abdominal pain, anal bleeding, blood in stool, constipation, diarrhea, nausea, rectal pain and vomiting.   Endocrine: Negative for cold intolerance, heat intolerance, polydipsia, polyphagia and polyuria.   Genitourinary: Negative for decreased urine volume, difficulty urinating, dysuria, enuresis, flank pain, frequency, genital sores, hematuria and urgency.   Musculoskeletal: Negative for arthralgias, back pain, gait problem, joint swelling, myalgias, neck pain and neck stiffness.   Skin: Negative for color change, pallor, rash and wound.   Allergic/Immunologic: Negative for environmental allergies, food allergies and immunocompromised state.   Neurological: Positive for dizziness, light-headedness and headaches. Negative for tremors, seizures, syncope, facial asymmetry, speech difficulty, weakness and numbness.   Hematological: Negative for adenopathy. Does not bruise/bleed easily.   Psychiatric/Behavioral: Negative for agitation, behavioral problems, confusion, decreased concentration, dysphoric mood, hallucinations, self-injury, sleep disturbance and suicidal ideas. The patient is not nervous/anxious and is not hyperactive.         Physical Exam:   /62 (BP Location: Right arm, Patient Position: Sitting, Cuff Size: Adult)   Temp 97.8 °F (36.6 °C) (Infrared)   Ht 175.3 cm (69\")   Wt 68.9 kg (152 lb)   BMI 22.45 kg/m²   Awake, alert and oriented x " 3  Speech f/c  Opens eyes spont  Pupils 3 mm rx bilaterally  Extraocular muscles intact bilaterally  Normal sensation to light touch in all 3 distributions of CN V bilaterally  Face symmetric bilaterally  Tongue midline  5/5 in all 4 ext  Normal sensation to light touch in all 4 ext  2+DTR's  Farooq's bilaterally  No pronator drift or dysmetria  Gait not assessed    Diagnostic Studies:  All neurological imaging studies were independently reviewed unless stated otherwise    Assessment/Plan:  This is a 22 y.o. female presenting for evaluation of a type 1 Chiari malformation.  In reviewing the patient's imaging, she has a type I Chiari malformation measuring approximately 7 mm. In talking to the patient, she does have posterior headaches that are exacerbated by flexing forward or bearing down.  I would like to obtain a full ophthalmological exam to rule out papilledema.  Additionally, given her Farooq's on exam, I am going to obtain a cervical MRI to ensure that there is no syrinx.  We will plan on the patient return to clinic to review the results of these tests.  With regards to the T2 lesion found in the middle cerebral peduncle, this is stable on repeat imaging and does not enhance with contrast.  I think that this is a benign finding and is not specific for any pathology.  I do not think she needs any further imaging for this lesion.    Diagnoses and all orders for this visit:    1. Chiari malformation type I (HCC) (Primary)        Gregg Thompson MD  02/06/23  10:40 EST

## 2023-03-01 ENCOUNTER — HOSPITAL ENCOUNTER (OUTPATIENT)
Dept: MRI IMAGING | Facility: HOSPITAL | Age: 23
Discharge: HOME | End: 2023-03-01
Admitting: STUDENT IN AN ORGANIZED HEALTH CARE EDUCATION/TRAINING PROGRAM
Payer: COMMERCIAL

## 2023-03-01 DIAGNOSIS — G93.5 CHIARI MALFORMATION TYPE I: ICD-10-CM

## 2023-03-01 PROCEDURE — 72141 MRI NECK SPINE W/O DYE: CPT

## 2023-03-06 ENCOUNTER — OFFICE VISIT (OUTPATIENT)
Dept: NEUROSURGERY | Facility: CLINIC | Age: 23
End: 2023-03-06
Payer: COMMERCIAL

## 2023-03-06 VITALS
HEIGHT: 69 IN | DIASTOLIC BLOOD PRESSURE: 68 MMHG | WEIGHT: 151.6 LBS | SYSTOLIC BLOOD PRESSURE: 100 MMHG | TEMPERATURE: 97.5 F | BODY MASS INDEX: 22.45 KG/M2

## 2023-03-06 DIAGNOSIS — G93.5 CHIARI MALFORMATION TYPE I: Primary | ICD-10-CM

## 2023-03-06 PROCEDURE — 99213 OFFICE O/P EST LOW 20 MIN: CPT | Performed by: STUDENT IN AN ORGANIZED HEALTH CARE EDUCATION/TRAINING PROGRAM

## 2023-03-06 NOTE — PROGRESS NOTES
"NEUROSURGERY PROGRESS NOTE    Patient: Marcelina Vemra  : 2000    Primary Care Provider: Belkis Navarro APRN    Chief Complaint: Type I Chiari malformation    Subjective: This is a 22-year-old female who previously evaluated for a type I Chiari malformation measuring approximately 7 mm.  At her last visit, I referred the patient for a cervical MRI to rule out syrinx as well as to ophthalmology to rule out papilledema.  She comes back in today for follow-up.  Her work-up has been negative for papilledema and a syrinx.  She still has some posterior headaches when she flexes her neck, but otherwise is feeling well.  She denies any weakness or dysfunction of her hands.    Objective    Vital Signs: Blood pressure 100/68, temperature 97.5 °F (36.4 °C), temperature source Infrared, height 175.3 cm (69\"), weight 68.8 kg (151 lb 9.6 oz).    Physical Exam  Awake, alert and oriented x 3  Opens eyes spont  Pupils 3 mm rx bilat  Extraocular muscles intact bilaterally  Face symmetric bilaterally  Tongue midline  5/5 in all 4 ext  Mild Farooq's on the right    Current Medications:   Current Outpatient Medications:   •  ferrous sulfate 324 (65 Fe) MG tablet delayed-release EC tablet, Take 1 tablet by mouth Daily With Breakfast., Disp: 30 tablet, Rfl: 6     Laboratory Results:                              Brief Urine Lab Results  (Last result in the past 365 days)      Color   Clarity   Blood   Leuk Est   Nitrite   Protein   CREAT   Urine HCG        10/26/22 0923 Yellow   Clear   Negative   Trace   Negative   Negative               Microbiology Results (last 10 days)     ** No results found for the last 240 hours. **          Diagnostic Imaging: I reviewed and independently interpreted the new imaging.     Assessment/Plan:  This is a 22-year-old female who I previously evaluated for a type I Chiari malformation.  Upon further work-up, she has been found negative for a syrinx as well as papilledema.  Given her young age and " lack of either of these associated symptoms, I do not think surgical intervention is warranted.  I did explain to the patient that over time she could develop worsening issues, and if she does, she should call me for further discussion.  At this time, however, we will not schedule any further follow-up.    Diagnoses and all orders for this visit:    1. Chiari malformation type I (HCC) (Primary)        Gregg Thompson MD  03/06/23  08:47 EST

## 2024-01-17 ENCOUNTER — OFFICE VISIT (OUTPATIENT)
Dept: INTERNAL MEDICINE | Facility: CLINIC | Age: 24
End: 2024-01-17
Payer: COMMERCIAL

## 2024-01-17 ENCOUNTER — LAB (OUTPATIENT)
Dept: LAB | Facility: HOSPITAL | Age: 24
End: 2024-01-17
Payer: COMMERCIAL

## 2024-01-17 VITALS
DIASTOLIC BLOOD PRESSURE: 84 MMHG | HEART RATE: 60 BPM | OXYGEN SATURATION: 99 % | WEIGHT: 152 LBS | HEIGHT: 69 IN | SYSTOLIC BLOOD PRESSURE: 122 MMHG | TEMPERATURE: 97.6 F | BODY MASS INDEX: 22.51 KG/M2

## 2024-01-17 DIAGNOSIS — R09.82 PND (POST-NASAL DRIP): ICD-10-CM

## 2024-01-17 DIAGNOSIS — Z00.00 HEALTH CARE MAINTENANCE: ICD-10-CM

## 2024-01-17 DIAGNOSIS — Z13.21 ENCOUNTER FOR VITAMIN DEFICIENCY SCREENING: ICD-10-CM

## 2024-01-17 DIAGNOSIS — R53.83 FATIGUE, UNSPECIFIED TYPE: ICD-10-CM

## 2024-01-17 DIAGNOSIS — Z00.00 ANNUAL PHYSICAL EXAM: Primary | ICD-10-CM

## 2024-01-17 DIAGNOSIS — Z12.4 SCREENING FOR CERVICAL CANCER: ICD-10-CM

## 2024-01-17 DIAGNOSIS — E34.9 HORMONE DISTURBANCE: ICD-10-CM

## 2024-01-17 DIAGNOSIS — Z13.220 SCREENING FOR HYPERLIPIDEMIA: ICD-10-CM

## 2024-01-17 DIAGNOSIS — Z13.29 SCREENING FOR HYPOTHYROIDISM: ICD-10-CM

## 2024-01-17 LAB
25(OH)D3 SERPL-MCNC: 19.2 NG/ML (ref 30–100)
ALBUMIN SERPL-MCNC: 5 G/DL (ref 3.5–5.2)
ALBUMIN/GLOB SERPL: 1.4 G/DL
ALP SERPL-CCNC: 90 U/L (ref 39–117)
ALT SERPL W P-5'-P-CCNC: 10 U/L (ref 1–33)
ANION GAP SERPL CALCULATED.3IONS-SCNC: 13.7 MMOL/L (ref 5–15)
AST SERPL-CCNC: 15 U/L (ref 1–32)
BILIRUB SERPL-MCNC: 0.7 MG/DL (ref 0–1.2)
BUN SERPL-MCNC: 7 MG/DL (ref 6–20)
BUN/CREAT SERPL: 8.1 (ref 7–25)
CALCIUM SPEC-SCNC: 9.8 MG/DL (ref 8.6–10.5)
CHLORIDE SERPL-SCNC: 102 MMOL/L (ref 98–107)
CHOLEST SERPL-MCNC: 189 MG/DL (ref 0–200)
CO2 SERPL-SCNC: 23.3 MMOL/L (ref 22–29)
CREAT SERPL-MCNC: 0.86 MG/DL (ref 0.57–1)
EGFRCR SERPLBLD CKD-EPI 2021: 96.9 ML/MIN/1.73
ESTRADIOL SERPL HS-MCNC: 44.9 PG/ML
FSH SERPL-ACNC: 5.99 MIU/ML
GLOBULIN UR ELPH-MCNC: 3.6 GM/DL
GLUCOSE SERPL-MCNC: 79 MG/DL (ref 65–99)
HDLC SERPL-MCNC: 59 MG/DL (ref 40–60)
LDLC SERPL CALC-MCNC: 121 MG/DL (ref 0–100)
LDLC/HDLC SERPL: 2.04 {RATIO}
POTASSIUM SERPL-SCNC: 4.2 MMOL/L (ref 3.5–5.2)
PROT SERPL-MCNC: 8.6 G/DL (ref 6–8.5)
SODIUM SERPL-SCNC: 139 MMOL/L (ref 136–145)
TRIGL SERPL-MCNC: 48 MG/DL (ref 0–150)
TSH SERPL DL<=0.05 MIU/L-ACNC: 1.59 UIU/ML (ref 0.27–4.2)
VIT B12 BLD-MCNC: 622 PG/ML (ref 211–946)
VLDLC SERPL-MCNC: 9 MG/DL (ref 5–40)

## 2024-01-17 PROCEDURE — 80061 LIPID PANEL: CPT

## 2024-01-17 PROCEDURE — 83001 ASSAY OF GONADOTROPIN (FSH): CPT

## 2024-01-17 PROCEDURE — 84402 ASSAY OF FREE TESTOSTERONE: CPT

## 2024-01-17 PROCEDURE — 82670 ASSAY OF TOTAL ESTRADIOL: CPT

## 2024-01-17 PROCEDURE — 84403 ASSAY OF TOTAL TESTOSTERONE: CPT

## 2024-01-17 PROCEDURE — 82306 VITAMIN D 25 HYDROXY: CPT

## 2024-01-17 PROCEDURE — 36415 COLL VENOUS BLD VENIPUNCTURE: CPT

## 2024-01-17 PROCEDURE — 80050 GENERAL HEALTH PANEL: CPT

## 2024-01-17 PROCEDURE — 82607 VITAMIN B-12: CPT

## 2024-01-17 RX ORDER — LORATADINE 10 MG/1
10 TABLET ORAL DAILY
Qty: 30 TABLET | Refills: 11 | Status: SHIPPED | OUTPATIENT
Start: 2024-01-17

## 2024-01-17 NOTE — PROGRESS NOTES
Annual Physical     Name: Marcelina Verma    : 2000     MRN: [id number]     Chief Complaint  Annual Exam    Subjective     History of Present Illness:  Marcelina Verma is a 23 y.o. female who presents today for annual physical exam.    The patient is being seen for a health maintenance evaluation.    Past Medical History:   Diagnosis Date    Dizziness        No past surgical history on file.    Social History     Socioeconomic History    Marital status: Single   Tobacco Use    Smoking status: Never    Smokeless tobacco: Never   Vaping Use    Vaping Use: Never used   Substance and Sexual Activity    Alcohol use: Not Currently     Comment: Once every 3 months    Drug use: Never    Sexual activity: Not Currently     Partners: Male     Birth control/protection: None         Current Outpatient Medications:     loratadine (Claritin) 10 MG tablet, Take 1 tablet by mouth Daily., Disp: 30 tablet, Rfl: 11    vitamin D (ERGOCALCIFEROL) 1.25 MG (48719 UT) capsule capsule, Take 1 capsule by mouth 1 (One) Time Per Week., Disp: 12 capsule, Rfl: 1    General History  Marcelina  does not have regular dental visits.  She does not complain of vision problems. Last eye exam was .  Immunizations are not up to date. The patient needs the following immunizations: COVID booster and Tdap.    Lifestyle  Marcelina  consumes  an average diet. Could use some work .  She exercises  5 days weekly for 1 hour .    Reproductive Health  Marcelina  is premenopausal.  She reports periods are irregular.  She is not sexually active. Her contraceptive plan is abstinence.    Screening  Last pap was 1.5 years.  Last Completed Pap Smear       This patient has no relevant Health Maintenance data.        . History of abnormal pap smear or family history of gyn cancer: No abnormal. No known FH.  Last mammogram was never.  Last Completed Mammogram       This patient has no relevant Health Maintenance data.        . Personal or family history of  abnormal mammograms or breast cancer: No known FH.  Last colonoscopy was never.  Last Completed Colonoscopy       This patient has no relevant Health Maintenance data.        . Family history of colon cancer: No known FH.  Last DEXA was never.    Health Maintenance Summary            Overdue - COVID-19 Vaccine (1) Never done      01/17/2024  Postponed until 1/19/2024 by Jennifer Ortiz MA (Patient Refused - patient declined at this time)              Overdue - TDAP/TD VACCINES (1 - Tdap) Never done      No completion, postpone, or frequency change history exists for this topic.              Overdue - HEPATITIS C SCREENING (Once) Never done      No completion, postpone, or frequency change history exists for this topic.              Overdue - PAP SMEAR (Every 3 Years) Never done      No completion, postpone, or frequency change history exists for this topic.              Postponed - INFLUENZA VACCINE (Yearly - August to March) Postponed until 3/31/2024      01/17/2024  Postponed until 3/31/2024 by Jennifer Ortiz MA (Patient Refused - patient declined at this time)              ANNUAL PHYSICAL (Yearly) Next due on 1/17/2025 01/17/2024  Done              Pneumococcal Vaccine 0-64 (Series Information) Aged Out      No completion, postpone, or frequency change history exists for this topic.              Discontinued - HPV VACCINES  Discontinued      01/17/2024  Frequency changed to Never by Belkis Navarro APRN (Declined)                  Immunization History   Administered Date(s) Administered    DTaP, Unspecified 10/03/2000, 08/14/2003    Hib (PRP-OMP) 01/30/2003    IPV 08/14/2003    MMR 10/03/2000, 01/30/2003       Review of Systems   Constitutional: Negative.    HENT: Negative.     Respiratory: Negative.     Cardiovascular: Negative.    Gastrointestinal: Negative.    Genitourinary: Negative.    Musculoskeletal: Negative.    Neurological: Negative.    Psychiatric/Behavioral: Negative.         Objective     Vital  "Signs  /84   Pulse 60   Temp 97.6 °F (36.4 °C)   Ht 175.3 cm (69.02\")   Wt 68.9 kg (152 lb)   SpO2 99%   BMI 22.44 kg/m²   Estimated body mass index is 22.44 kg/m² as calculated from the following:    Height as of this encounter: 175.3 cm (69.02\").    Weight as of this encounter: 68.9 kg (152 lb).    BMI is within normal parameters. No other follow-up for BMI required.       Physical Exam  Constitutional:       General: She is awake. She is not in acute distress.     Appearance: Normal appearance. She is well-developed, well-groomed and normal weight. She is not ill-appearing.   HENT:      Head: Normocephalic and atraumatic.      Right Ear: Tympanic membrane, ear canal and external ear normal.      Left Ear: Tympanic membrane, ear canal and external ear normal.      Nose: Nose normal.      Mouth/Throat:      Mouth: Mucous membranes are moist.      Pharynx: Oropharynx is clear.   Eyes:      General: No scleral icterus.     Extraocular Movements: Extraocular movements intact.      Conjunctiva/sclera: Conjunctivae normal.      Pupils: Pupils are equal, round, and reactive to light.   Neck:      Trachea: Trachea normal.   Cardiovascular:      Rate and Rhythm: Normal rate and regular rhythm.      Pulses: Normal pulses.      Heart sounds: Normal heart sounds. No murmur heard.  Pulmonary:      Effort: Pulmonary effort is normal. No tachypnea, accessory muscle usage or respiratory distress.      Breath sounds: Normal breath sounds. No wheezing, rhonchi or rales.   Abdominal:      General: Abdomen is flat. Bowel sounds are normal. There is no distension.      Palpations: Abdomen is soft.      Tenderness: There is no abdominal tenderness.   Genitourinary:     Comments: Deferred  Musculoskeletal:         General: No swelling. Normal range of motion.      Cervical back: Normal range of motion and neck supple. No tenderness.      Right lower leg: No edema.      Left lower leg: No edema.   Skin:     General: Skin is " warm and dry.      Capillary Refill: Capillary refill takes less than 2 seconds.      Coloration: Skin is not jaundiced or pale.      Findings: No lesion or rash.   Neurological:      General: No focal deficit present.      Mental Status: She is alert and oriented to person, place, and time. Mental status is at baseline.      Motor: No weakness.      Gait: Gait is intact.   Psychiatric:         Attention and Perception: Attention normal.         Mood and Affect: Mood normal.         Speech: Speech normal.         Behavior: Behavior normal. Behavior is cooperative.         Thought Content: Thought content normal.         Judgment: Judgment normal.          Assessment and Plan     Diagnoses and all orders for this visit:    1. Annual physical exam (Primary)    2. PND (post-nasal drip)  -     loratadine (Claritin) 10 MG tablet; Take 1 tablet by mouth Daily.  Dispense: 30 tablet; Refill: 11    3. Screening for cervical cancer  -     Ambulatory Referral to Gynecology        Plan:  Annual physical exam.  Orders previously placed for fasting labs.  Fasting labs drawn today.  Will review lab results with patient once received.  Further plan of care based on lab result findings.  Encouraged to maintain up to date immunizations and health maintenance screenings.  Continue with healthy lifestyle choices such as healthy diet and eating habits and regular exercise routine.  Continue with adequate oral hydration and rest.  Annual physical exam in 1 year.  Return to clinic as needed.    Follow Up  Return in about 1 year (around 1/17/2025) for Annual.    RAQUEL White    Part of this note may be an electronic transcription/translation of spoken language to printed text using the Dragon Dictation System.

## 2024-01-18 LAB
DEPRECATED RDW RBC AUTO: 40.7 FL (ref 37–54)
ERYTHROCYTE [DISTWIDTH] IN BLOOD BY AUTOMATED COUNT: 12.3 % (ref 12.3–15.4)
HCT VFR BLD AUTO: 42.5 % (ref 34–46.6)
HGB BLD-MCNC: 14.4 G/DL (ref 12–15.9)
MCH RBC QN AUTO: 30.5 PG (ref 26.6–33)
MCHC RBC AUTO-ENTMCNC: 33.9 G/DL (ref 31.5–35.7)
MCV RBC AUTO: 90 FL (ref 79–97)
PLATELET # BLD AUTO: 241 10*3/MM3 (ref 140–450)
PMV BLD AUTO: 10 FL (ref 6–12)
RBC # BLD AUTO: 4.72 10*6/MM3 (ref 3.77–5.28)
WBC NRBC COR # BLD AUTO: 4.97 10*3/MM3 (ref 3.4–10.8)

## 2024-01-24 DIAGNOSIS — E55.9 VITAMIN D DEFICIENCY: Primary | ICD-10-CM

## 2024-01-24 RX ORDER — ERGOCALCIFEROL 1.25 MG/1
50000 CAPSULE ORAL WEEKLY
Qty: 12 CAPSULE | Refills: 1 | Status: SHIPPED | OUTPATIENT
Start: 2024-01-24

## 2024-01-29 LAB
TESTOST FREE SERPL-MCNC: 2.2 PG/ML (ref 0–4.2)
TESTOST SERPL-MCNC: 41 NG/DL (ref 13–71)

## 2024-05-14 ENCOUNTER — TELEPHONE (OUTPATIENT)
Dept: NEUROSURGERY | Facility: CLINIC | Age: 24
End: 2024-05-14
Payer: COMMERCIAL

## 2024-05-14 NOTE — TELEPHONE ENCOUNTER
Provider:  Donna  Surgery/Procedure:  CARIE  Surgery/Procedure Date:    Last visit:  3/6/23   Next visit: NA     Reason for call:  Spoke to Ms. Verma, who has a history of a chiari malformation. She reports that last night she started to have some tingling in her right leg and arm, it did resolve last night, however since about 6:30 this morning she has had return of tingling in her right arm. She states it feels like the arm has fallen asleep, no numbness and still has function in her arm. Yesterday is the first occurrence that this has happened before. She has some rare occasional pain in the back of her neck, but it only lasts briefly, has dizziness but that is not new or changed/worsened. She denies any headaches, denies weakness, and denies any confusion or change on level of consciousness. Had no strenuous activity yesterday.

## 2024-05-16 DIAGNOSIS — G93.5 CHIARI MALFORMATION TYPE I: Primary | ICD-10-CM

## 2024-05-21 ENCOUNTER — HOSPITAL ENCOUNTER (OUTPATIENT)
Facility: HOSPITAL | Age: 24
Discharge: HOME | End: 2024-05-21
Payer: COMMERCIAL

## 2024-05-21 DIAGNOSIS — G93.5 CHIARI MALFORMATION TYPE I: ICD-10-CM

## 2024-05-21 PROCEDURE — 72141 MRI NECK SPINE W/O DYE: CPT

## 2024-06-04 ENCOUNTER — TELEPHONE (OUTPATIENT)
Dept: CARDIOLOGY | Facility: CLINIC | Age: 24
End: 2024-06-04
Payer: COMMERCIAL

## 2024-06-04 NOTE — TELEPHONE ENCOUNTER
Caller: Marcelina Verma    Relationship: Self    Best call back number: [phone]    What is the best time to reach you: ANY TIME    What was the call regarding: PT STATES THAT WHEN SHE IS EXERCISING HER HR GETS REALLY HIGH AND SHE FEELS DIZZY. WOULD LIKE TO GET AN APPT. PLEASE CALL BACK TO ADVISE, THANK YOU.

## 2024-06-04 NOTE — TELEPHONE ENCOUNTER
PT called requesting appointment due to elevated HR when exercising and feeling dizzy.  HR got as high as 180.  PT also states that on 5/14/24 the left side of her body tingled for 12 hours straight.      Appt was made for 6/5/24and labs have been requested     PT was agreeable and verbalized understanding

## 2024-06-05 ENCOUNTER — OFFICE VISIT (OUTPATIENT)
Dept: CARDIOLOGY | Facility: CLINIC | Age: 24
End: 2024-06-05
Payer: COMMERCIAL

## 2024-06-05 VITALS
HEIGHT: 69 IN | OXYGEN SATURATION: 98 % | DIASTOLIC BLOOD PRESSURE: 72 MMHG | SYSTOLIC BLOOD PRESSURE: 120 MMHG | WEIGHT: 153 LBS | HEART RATE: 66 BPM | BODY MASS INDEX: 22.66 KG/M2

## 2024-06-05 DIAGNOSIS — R55 NEAR SYNCOPE: Primary | ICD-10-CM

## 2024-06-05 PROCEDURE — 99213 OFFICE O/P EST LOW 20 MIN: CPT | Performed by: PHYSICIAN ASSISTANT

## 2024-06-05 RX ORDER — FERROUS SULFATE 325(65) MG
325 TABLET ORAL AS NEEDED
COMMUNITY

## 2024-06-05 RX ORDER — DROSPIRENONE AND ETHINYL ESTRADIOL 0.02-3(28)
1 KIT ORAL DAILY
COMMUNITY
Start: 2024-04-29

## 2024-06-05 RX ORDER — SODIUM CHLORIDE 1 G/1
1 TABLET ORAL 3 TIMES DAILY
Qty: 30 TABLET | Refills: 3 | Status: SHIPPED | OUTPATIENT
Start: 2024-06-05

## 2024-06-05 RX ORDER — BISOPROLOL FUMARATE 5 MG/1
2.5 TABLET, FILM COATED ORAL DAILY
Qty: 30 TABLET | Refills: 6 | Status: SHIPPED | OUTPATIENT
Start: 2024-06-05 | End: 2024-06-05 | Stop reason: ALTCHOICE

## 2024-06-19 DIAGNOSIS — R55 NEAR SYNCOPE: Primary | ICD-10-CM

## 2024-07-20 DIAGNOSIS — E55.9 VITAMIN D DEFICIENCY: ICD-10-CM

## 2024-07-22 RX ORDER — ERGOCALCIFEROL 1.25 MG/1
50000 CAPSULE ORAL WEEKLY
Qty: 12 CAPSULE | Refills: 1 | Status: SHIPPED | OUTPATIENT
Start: 2024-07-22

## 2025-02-18 ENCOUNTER — PATIENT ROUNDING (BHMG ONLY) (OUTPATIENT)
Dept: URGENT CARE | Facility: CLINIC | Age: 25
End: 2025-02-18
Payer: COMMERCIAL

## 2025-04-14 ENCOUNTER — LAB (OUTPATIENT)
Dept: LAB | Facility: HOSPITAL | Age: 25
End: 2025-04-14
Payer: COMMERCIAL

## 2025-04-14 ENCOUNTER — OFFICE VISIT (OUTPATIENT)
Dept: INTERNAL MEDICINE | Facility: CLINIC | Age: 25
End: 2025-04-14
Payer: COMMERCIAL

## 2025-04-14 VITALS
HEART RATE: 73 BPM | WEIGHT: 144 LBS | DIASTOLIC BLOOD PRESSURE: 68 MMHG | SYSTOLIC BLOOD PRESSURE: 124 MMHG | HEIGHT: 69 IN | OXYGEN SATURATION: 95 % | BODY MASS INDEX: 21.33 KG/M2

## 2025-04-14 DIAGNOSIS — Z13.21 ENCOUNTER FOR VITAMIN DEFICIENCY SCREENING: ICD-10-CM

## 2025-04-14 DIAGNOSIS — Z13.29 SCREENING FOR HYPOTHYROIDISM: ICD-10-CM

## 2025-04-14 DIAGNOSIS — Z00.00 ANNUAL PHYSICAL EXAM: Primary | ICD-10-CM

## 2025-04-14 DIAGNOSIS — Z13.220 SCREENING FOR HYPERLIPIDEMIA: ICD-10-CM

## 2025-04-14 LAB
DEPRECATED RDW RBC AUTO: 39.1 FL (ref 37–54)
ERYTHROCYTE [DISTWIDTH] IN BLOOD BY AUTOMATED COUNT: 12 % (ref 12.3–15.4)
HCT VFR BLD AUTO: 38.7 % (ref 34–46.6)
HGB BLD-MCNC: 13 G/DL (ref 12–15.9)
MCH RBC QN AUTO: 30 PG (ref 26.6–33)
MCHC RBC AUTO-ENTMCNC: 33.6 G/DL (ref 31.5–35.7)
MCV RBC AUTO: 89.4 FL (ref 79–97)
PLATELET # BLD AUTO: 202 10*3/MM3 (ref 140–450)
PMV BLD AUTO: 9.6 FL (ref 6–12)
RBC # BLD AUTO: 4.33 10*6/MM3 (ref 3.77–5.28)
WBC NRBC COR # BLD AUTO: 7.09 10*3/MM3 (ref 3.4–10.8)

## 2025-04-14 PROCEDURE — 82306 VITAMIN D 25 HYDROXY: CPT | Performed by: NURSE PRACTITIONER

## 2025-04-14 PROCEDURE — 36415 COLL VENOUS BLD VENIPUNCTURE: CPT | Performed by: NURSE PRACTITIONER

## 2025-04-14 PROCEDURE — 80061 LIPID PANEL: CPT | Performed by: NURSE PRACTITIONER

## 2025-04-14 PROCEDURE — 82607 VITAMIN B-12: CPT | Performed by: NURSE PRACTITIONER

## 2025-04-14 PROCEDURE — 80050 GENERAL HEALTH PANEL: CPT | Performed by: NURSE PRACTITIONER

## 2025-04-14 NOTE — PROGRESS NOTES
Annual Physical     Name: Marcelina Verma    : 2000     MRN: [id number]     Chief Complaint  Annual Exam    Subjective     History of Present Illness:  Marcelina Verma is a 24 y.o. female who presents today for annual physical exam.      The patient is being seen for a health maintenance evaluation.    Past Medical History:   Diagnosis Date    Chiari malformation type I     Dizziness        History reviewed. No pertinent surgical history.    Social History     Socioeconomic History    Marital status: Single   Tobacco Use    Smoking status: Never    Smokeless tobacco: Never   Vaping Use    Vaping status: Never Used   Substance and Sexual Activity    Alcohol use: Not Currently     Comment: Once every 3 months    Drug use: Never    Sexual activity: Not Currently     Partners: Male     Birth control/protection: None       No current outpatient medications on file.    General History  Marcelina  does have regular dental visits.  She does not complain of vision problems. Last eye exam was 2 years.  Immunizations are not up to date. The patient needs the following immunizations: Declines vaccinations today.    Lifestyle  Marcelina  consumes  an average diet .  She exercises  4 times weekly .    Reproductive Health  Marcelina  is premenopausal.  She reports periods are irregular.  She is not sexually active. Her contraceptive plan is no method.    Screening  Last pap was .  Last Completed Pap Smear    This patient has no relevant Health Maintenance data.     . History of abnormal pap smear or family history of gyn cancer: Yes, abnormal pap in the past. No known FH.  Last mammogram was never.  Last Completed Mammogram    This patient has no relevant Health Maintenance data.     . Personal or family history of abnormal mammograms or breast cancer: No known FH.  Last colonoscopy was never.  Last Completed Colonoscopy    This patient has no relevant Health Maintenance data.     . Family history of colon  cancer: No known FH.  Last DEXA was never.    Health Maintenance Summary            Current Care Gaps       PAP SMEAR (Every 3 Years) Never done     No completion, postpone, or frequency change history exists for this topic.              HEPATITIS C SCREENING (Once) Never done     No completion, postpone, or frequency change history exists for this topic.                      Upcoming       COVID-19 Vaccine (1 - 2024-25 season) Postponed until 12/31/2025 04/14/2025  Postponed until 12/31/2025 by Lisa Carrasco MA (Patient Refused)    01/17/2024  Postponed until 1/19/2024 by Jennifer Ortiz MA (Patient Refused - patient declined at this time)              TDAP/TD VACCINES (1 - Tdap) Postponed until 12/31/2025 04/14/2025  Postponed until 12/31/2025 by Lisa Carrasco MA (Patient Refused)              INFLUENZA VACCINE (Yearly - July to March) Next due on 7/1/2025 01/17/2024  Postponed until 3/31/2024 by Jennifer Ortiz MA (Patient Refused - patient declined at this time)              ANNUAL PHYSICAL (Yearly) Next due on 4/14/2026 04/14/2025  Done    01/17/2024  Done                      Completed or No Longer Recommended       Pneumococcal Vaccine 0-49 (Series Information) Aged Out     No completion, postpone, or frequency change history exists for this topic.              HPV VACCINES  Discontinued      01/17/2024  Frequency changed to Never by Belkis Navarro APRN (Declined)                          Immunization History   Administered Date(s) Administered    DTaP, Unspecified 10/03/2000, 08/14/2003    Hib (PRP-OMP) 01/30/2003    IPV 08/14/2003    MMR 10/03/2000, 01/30/2003       Review of Systems   Constitutional: Negative.    HENT: Negative.     Respiratory: Negative.     Cardiovascular: Negative.    Gastrointestinal: Negative.    Genitourinary: Negative.    Musculoskeletal: Negative.    Neurological: Negative.    Psychiatric/Behavioral: Negative.         Objective     Vital Signs  /68    "Pulse 73   Ht 175.3 cm (69.02\")   Wt 65.3 kg (144 lb)   SpO2 95%   BMI 21.26 kg/m²   Estimated body mass index is 21.26 kg/m² as calculated from the following:    Height as of this encounter: 175.3 cm (69.02\").    Weight as of this encounter: 65.3 kg (144 lb).    BMI is within normal parameters. No other follow-up for BMI required.       Physical Exam  Constitutional:       General: She is awake. She is not in acute distress.     Appearance: Normal appearance. She is well-developed, well-groomed and normal weight. She is not ill-appearing.   HENT:      Head: Normocephalic and atraumatic.      Right Ear: Tympanic membrane, ear canal and external ear normal.      Left Ear: Tympanic membrane, ear canal and external ear normal.      Nose: Nose normal.      Mouth/Throat:      Mouth: Mucous membranes are moist.      Pharynx: Oropharynx is clear.   Eyes:      General: No scleral icterus.     Extraocular Movements: Extraocular movements intact.      Conjunctiva/sclera: Conjunctivae normal.      Pupils: Pupils are equal, round, and reactive to light.   Neck:      Trachea: Trachea normal.   Cardiovascular:      Rate and Rhythm: Normal rate and regular rhythm.      Pulses: Normal pulses.      Heart sounds: Normal heart sounds. No murmur heard.  Pulmonary:      Effort: Pulmonary effort is normal. No tachypnea, accessory muscle usage or respiratory distress.      Breath sounds: Normal breath sounds. No wheezing, rhonchi or rales.   Abdominal:      General: Abdomen is flat. Bowel sounds are normal. There is no distension.      Palpations: Abdomen is soft.      Tenderness: There is no abdominal tenderness.   Genitourinary:     Comments: Deferred  Musculoskeletal:         General: No swelling. Normal range of motion.      Cervical back: Normal range of motion and neck supple. No tenderness.      Right lower leg: No edema.      Left lower leg: No edema.   Skin:     General: Skin is warm and dry.      Capillary Refill: Capillary " refill takes less than 2 seconds.      Coloration: Skin is not jaundiced or pale.      Findings: No lesion or rash.   Neurological:      General: No focal deficit present.      Mental Status: She is alert and oriented to person, place, and time. Mental status is at baseline.      Motor: No weakness.      Gait: Gait is intact.   Psychiatric:         Attention and Perception: Attention normal.         Mood and Affect: Mood normal.         Speech: Speech normal.         Behavior: Behavior normal. Behavior is cooperative.         Thought Content: Thought content normal.         Judgment: Judgment normal.          Assessment and Plan     Diagnoses and all orders for this visit:    1. Annual physical exam (Primary)  -     CBC (No Diff)  -     Comprehensive metabolic panel  -     TSH Rfx On Abnormal To Free T4  -     Lipid Panel  -     Vitamin D,25-Hydroxy  -     Vitamin B12    2. Screening for hyperlipidemia  -     Lipid Panel    3. Screening for hypothyroidism  -     TSH Rfx On Abnormal To Free T4    4. Encounter for vitamin deficiency screening  -     Vitamin D,25-Hydroxy  -     Vitamin B12        Plan:  Annual physical exam.  Orders placed for fasting labs.  Will review lab results with patient once received and reviewed.  Further plan of care based on lab result findings.  Declines vaccinations today.  Up-to-date on Pap smear.  Colorectal cancer screenings to start age 45.  Breast cancer screenings to start at age 40.  Continue with up to date immunizations and health maintenance screenings.  Continue with healthy lifestyle choices such as healthy diet and eating habits and regular exercise routine.  Continue with adequate oral hydration and rest.  Annual physical exam in 1 year.  Return to clinic sooner if needed.    Follow Up  Return in about 1 year (around 4/14/2026), or if symptoms worsen or fail to improve.      RAQUEL White    Part of this note may be an electronic transcription/translation of spoken  language to printed text using the Dragon Dictation System.

## 2025-04-15 LAB
25(OH)D3 SERPL-MCNC: 29.4 NG/ML (ref 30–100)
ALBUMIN SERPL-MCNC: 4.5 G/DL (ref 3.5–5.2)
ALBUMIN/GLOB SERPL: 1.3 G/DL
ALP SERPL-CCNC: 71 U/L (ref 39–117)
ALT SERPL W P-5'-P-CCNC: 7 U/L (ref 1–33)
ANION GAP SERPL CALCULATED.3IONS-SCNC: 11.4 MMOL/L (ref 5–15)
AST SERPL-CCNC: 15 U/L (ref 1–32)
BILIRUB SERPL-MCNC: 0.5 MG/DL (ref 0–1.2)
BUN SERPL-MCNC: 11 MG/DL (ref 6–20)
BUN/CREAT SERPL: 12.8 (ref 7–25)
CALCIUM SPEC-SCNC: 9.4 MG/DL (ref 8.6–10.5)
CHLORIDE SERPL-SCNC: 103 MMOL/L (ref 98–107)
CHOLEST SERPL-MCNC: 152 MG/DL (ref 0–200)
CO2 SERPL-SCNC: 23.6 MMOL/L (ref 22–29)
CREAT SERPL-MCNC: 0.86 MG/DL (ref 0.57–1)
EGFRCR SERPLBLD CKD-EPI 2021: 96.3 ML/MIN/1.73
GLOBULIN UR ELPH-MCNC: 3.4 GM/DL
GLUCOSE SERPL-MCNC: 78 MG/DL (ref 65–99)
HDLC SERPL-MCNC: 60 MG/DL (ref 40–60)
LDLC SERPL CALC-MCNC: 82 MG/DL (ref 0–100)
LDLC/HDLC SERPL: 1.39 {RATIO}
POTASSIUM SERPL-SCNC: 4.2 MMOL/L (ref 3.5–5.2)
PROT SERPL-MCNC: 7.9 G/DL (ref 6–8.5)
SODIUM SERPL-SCNC: 138 MMOL/L (ref 136–145)
TRIGL SERPL-MCNC: 43 MG/DL (ref 0–150)
TSH SERPL DL<=0.05 MIU/L-ACNC: 1.16 UIU/ML (ref 0.27–4.2)
VIT B12 BLD-MCNC: 515 PG/ML (ref 211–946)
VLDLC SERPL-MCNC: 10 MG/DL (ref 5–40)